# Patient Record
(demographics unavailable — no encounter records)

---

## 2024-12-17 NOTE — HISTORY OF PRESENT ILLNESS
[de-identified] : Cough, runny nose, started 2 days ago, no fever. [FreeTextEntry6] : cough and nasal congestion for the past few days.No fever, wheezing, vomiting.tolerating oral fluids fairly well.older sibling in the same household with similar symptoms.

## 2024-12-17 NOTE — DISCUSSION/SUMMARY
[FreeTextEntry1] : Advised to do watch and wait approach with starting Amoxicillin only if child has excessive fussiness or tugging on the left ear in the next 2 days.. Continue Saline drops nasal with suctioning frequently. Cool mist humidifier. Tylenol/Motrin prn fever. Increase oral fluids as tolerated, Follow up if if fever lasting more than 3 days, wheezing, difficulty breathing, vomiting or poor po intake

## 2024-12-17 NOTE — PHYSICAL EXAM
[NL] : warm, clear [FreeTextEntry3] : Left eye TM appears dull with erythema and effusion. Right ear is normal.

## 2025-02-04 NOTE — DISCUSSION/SUMMARY
[Normal Growth] : growth [Normal Development] : development [None] : No known medical problems [No Elimination Concerns] : elimination [No Feeding Concerns] : feeding [No Skin Concerns] : skin [Normal Sleep Pattern] : sleep [No Medications] : ~He/She~ is not on any medications [Parent/Guardian] : parent/guardian [FreeTextEntry1] : well 2 yr od male with speech delay/arom refer to [facility] (has appt) abx as pres supp care cool mist hum labs as ordered return in 2 wks/prn notify office if s/s persist or worsen return for well in 1 yr/prn

## 2025-02-04 NOTE — HISTORY OF PRESENT ILLNESS
[Mother] : mother [Cow's milk (Ounces per day ___)] : consumes [unfilled] oz of Cow's milk per day [Normal] : Normal [In crib] : In crib [None] : Primary Fluoride Source: None [Playtime 60 min a day] : Playtime 60 min a day [<2 hrs of screen time] : Less than 2 hrs of screen time [No] : Not at  exposure [Car seat in back seat] : Car seat in back seat [Water heater temperature set at <120 degrees F] : Water heater temperature set at <120 degrees F [Smoke Detectors] : Smoke detectors [Carbon Monoxide Detectors] : Carbon monoxide detectors [Exposure to electronic nicotine delivery system] : No exposure to electronic nicotine delivery system [At risk for exposure to TB] : Not at risk for exposure to Tuberculosis [Up to date] : Up to date [de-identified] : speech unclear- has appt with [facility] this wk for eval [de-identified] : will return for hep a in 2 wks [NO] : No

## 2025-02-04 NOTE — PHYSICAL EXAM
[Alert] : alert [No Acute Distress] : no acute distress [Normocephalic] : normocephalic [Anterior [place] Closed] : anterior fontanelle closed [Red Reflex Bilateral] : red reflex bilateral [PERRL] : PERRL [Normally Placed Ears] : normally placed ears [Auricles Well Formed] : auricles well formed [No Discharge] : no discharge [Nares Patent] : nares patent [Palate Intact] : palate intact [Uvula Midline] : uvula midline [Tooth Eruption] : tooth eruption  [Supple, full passive range of motion] : supple, full passive range of motion [No Palpable Masses] : no palpable masses [Symmetric Chest Rise] : symmetric chest rise [Clear to Auscultation Bilaterally] : clear to auscultation bilaterally [Regular Rate and Rhythm] : regular rate and rhythm [S1, S2 present] : S1, S2 present [No Murmurs] : no murmurs [+2 Femoral Pulses] : +2 femoral pulses [Soft] : soft [NonTender] : non tender [Non Distended] : non distended [Normoactive Bowel Sounds] : normoactive bowel sounds [No Hepatomegaly] : no hepatomegaly [No Splenomegaly] : no splenomegaly [Jet 1] : Jet 1 [Circumcised] : circumcised [Central Urethral Opening] : central urethral opening [Testicles Descended Bilaterally] : testicles descended bilaterally [Patent] : patent [Normally Placed] : normally placed [No Abnormal Lymph Nodes Palpated] : no abnormal lymph nodes palpated [No Clavicular Crepitus] : no clavicular crepitus [Symmetric Buttocks Creases] : symmetric buttocks creases [No Spinal Dimple] : no spinal dimple [NoTuft of Hair] : no tuft of hair [Cranial Nerves Grossly Intact] : cranial nerves grossly intact [No Rash or Lesions] : no rash or lesions [FreeTextEntry3] : rt tm dull and red

## 2025-02-04 NOTE — DEVELOPMENTAL MILESTONES
[Normal Development] : Normal Development [None] : none [Plays alongside other children] : plays alongside other children [Takes off some clothing] : takes off some clothing [Scoops well with spoon] : scoops well with spoon [Uses 50 words] : does not use 50 words [Combine 2 words into phrase or] : combines 2 words into phrase or sentences [Follows 2-step command] : follows 2-step command [Uses words that are 50% intelligible] : does not use words that are 50% intelligible to strangers [Kicks ball] : kicks ball  [Jumps off ground with 2 feet] : jumps off ground with 2 feet [Runs with coordination] : runs with coordination [Climbs up a ladder at a] : climbs up a ladder at a playground [Stacks objects] : stacks objects [Turns book pages] : turns book pages [Uses hands to turn objects] : uses hands to turn objects [FreeTextEntry1] : speech unclear- mo has appt with [facility] for eval [Passed] : passed [Yes] : Completed.

## 2025-05-10 NOTE — HISTORY OF PRESENT ILLNESS
[de-identified] : Fever.  [FreeTextEntry6] : As per mom fever 101.7 at home started this morning, given Tylenol at 3am.

## 2025-05-10 NOTE — DISCUSSION/SUMMARY
[FreeTextEntry1] : Rapid Strep: Neg Throat culture sent to lab  Treat symptoms with acetaminophen or ibuprofen as needed, increase fluids Discussed likely viral illness and expected course Call if no better 3 days, sooner for change/concerns/worsening recheck prn Phone follow-up after throat culture back  Recommend supportive care including antipyretics, fluids, and nasal saline followed by nasal suction. Return if symptoms worsen or persist..uri  Discussed signs/symptoms that would require immediate care.  Mother expressed understanding.

## 2025-07-01 NOTE — PHYSICAL EXAM
[Acute Distress] : no acute distress [Alert] : alert [Consolable] : consolable [Normocephalic] : normocephalic [EOMI] : grossly EOMI [Erythema] : erythema [Clear Rhinorrhea] : clear rhinorrhea [Supple] : supple [FROM] : full passive range of motion [Clear to Auscultation Bilaterally] : clear to auscultation bilaterally [Regular Rate and Rhythm] : regular rate and rhythm [Normal S1, S2 audible] : normal S1, S2 audible [Murmur] : no murmur [Soft] : soft [Tender] : nontender [Distended] : nondistended [Normal Bowel Sounds] : normal bowel sounds [Hepatosplenomegaly] : no hepatosplenomegaly [No Abnormal Lymph Nodes Palpated] : no abnormal lymph nodes palpated [Moves All Extremities x 4] : moves all extremities x4 [Warm, Well Perfused x4] : warm, well perfused x4 [Capillary Refill <2s] : capillary refill < 2s [Normotonic] : normotonic [NL] : warm, clear [Warm] : warm [Clear] : clear [FreeTextEntry3] : lt tm dull and red

## 2025-07-01 NOTE — DISCUSSION/SUMMARY
[FreeTextEntry1] : 2 yr old with fever/uri/alom rvp to lab abx as pres supp care increase fluids cool mist hum ns spray probiotics/yogurt r/c in 2 wks/prn notify office if s/s persist or worsen

## 2025-07-01 NOTE — HISTORY OF PRESENT ILLNESS
[de-identified] : INTERMITTENT FEVER for 24 hours, tmax 102.8, fussy and runny nose today [FreeTextEntry6] : MOTRIN GIVEN, LAST DOSE 7A.M.

## 2025-07-15 NOTE — HISTORY OF PRESENT ILLNESS
[FreeTextEntry1] : Norbert is a 2 year old male here today for surgical evaluation of an umbilical hernia. Mom has appreciated the defect since birth and notes it remains easily reducible. Norbert has not complained of any pain or discomfort associated with the protrusion from the umbilicus. Norbert is feeding well without emesis. No unexplained fevers or changes in energy levels noted. He is otherwise healthy and doing well.

## 2025-07-15 NOTE — CONSULT LETTER
[Dear  ___] : Dear  [unfilled], [Consult Letter:] : I had the pleasure of evaluating your patient, [unfilled]. [Please see my note below.] : Please see my note below. [Consult Closing:] : Thank you very much for allowing me to participate in the care of this patient.  If you have any questions, please do not hesitate to contact me. [Sincerely,] : Sincerely, [FreeTextEntry2] : Danette Caceres MD [FreeTextEntry3] : Miguel Gomez MD Surgeon in Chief , Surgery  & System Chief, Pediatric Surgery Professor Surgery and Pediatrics [facility]

## 2025-07-15 NOTE — ASSESSMENT
[FreeTextEntry1] : Norbert is a 2 year old male with a small reducible umbilical hernia. He is otherwise doing well and remains asymptomatic at this time. I counseled his mother regarding the issues, options, and expectations surrounding an umbilical hernia. We reviewed the role of surgery including a hernia repair to correct the defect. However, given the young age, I have recommended continued observation to see if there will be spontaneous closure within the first few years of life. Should the hernia persist by the time Norbert is closer to 4-5 years of age, the family can follow up with me to reexamine and discuss proceeding with surgical repair. Mom understands and agrees to monitor moving forward. She has my information and knows to contact me with any questions or concerns.

## 2025-07-15 NOTE — ADDENDUM
[FreeTextEntry1] : Documented by Samuel Araujo acting as a scribe for Dr. Gomez on 07/15/2025.   All medical record entries made by the Scribe were at my, Dr. Gomez, direction and personally dictated by me on 07/15/2025. I have reviewed the chart and agree that the record accurately reflects my personal performances of the history, physical exam, assessment and plan. I have also personally directed, reviewed, and agree with the instructions.